# Patient Record
Sex: FEMALE | Race: WHITE | NOT HISPANIC OR LATINO | URBAN - METROPOLITAN AREA
[De-identification: names, ages, dates, MRNs, and addresses within clinical notes are randomized per-mention and may not be internally consistent; named-entity substitution may affect disease eponyms.]

---

## 2020-07-10 PROBLEM — Z00.00 ENCOUNTER FOR PREVENTIVE HEALTH EXAMINATION: Status: ACTIVE | Noted: 2020-07-10

## 2020-09-13 ENCOUNTER — EMERGENCY (EMERGENCY)
Facility: HOSPITAL | Age: 45
LOS: 1 days | Discharge: HOME | End: 2020-09-13
Attending: STUDENT IN AN ORGANIZED HEALTH CARE EDUCATION/TRAINING PROGRAM | Admitting: STUDENT IN AN ORGANIZED HEALTH CARE EDUCATION/TRAINING PROGRAM
Payer: OTHER MISCELLANEOUS

## 2020-09-13 VITALS
SYSTOLIC BLOOD PRESSURE: 163 MMHG | OXYGEN SATURATION: 98 % | DIASTOLIC BLOOD PRESSURE: 100 MMHG | RESPIRATION RATE: 18 BRPM | HEART RATE: 80 BPM | TEMPERATURE: 98 F

## 2020-09-13 DIAGNOSIS — Z90.49 ACQUIRED ABSENCE OF OTHER SPECIFIED PARTS OF DIGESTIVE TRACT: Chronic | ICD-10-CM

## 2020-09-13 LAB
HIV COMBO RESULT: SIGNIFICANT CHANGE UP
HIV1+2 AB SPEC QL: SIGNIFICANT CHANGE UP

## 2020-09-13 PROCEDURE — 99283 EMERGENCY DEPT VISIT LOW MDM: CPT

## 2020-09-13 NOTE — ED PROVIDER NOTE - OBJECTIVE STATEMENT
45 y/o F hx htn s/p injury on thursday where a 5-10lb mario hit her on the right side of the head. There was no LOC or fall afterwards. Felt a bit dizzy and "out of it" for a bit, which has since resolved. She went to urgent care who told her she likely has concussion and to rest. Pain persisted, called PMD last night, told her to come to ED for eval. Has also noticed bump on outer left upper eyelid since yesterday, mild tender to palpation, no effect on vision. Denies current n/v, cp, sob, f/c, dizziness, weakness.

## 2020-09-13 NOTE — ED ADULT NURSE NOTE - OBJECTIVE STATEMENT
Received pt to intake bed 7, A+Ox3, ambulatory. C/O head trauma few days ago, when a window fell down on her head. NO LOC. pt originally had concussion like symptoms that subsided.  Respirations even and unlabored, normal work of breathing, no accessory muscle use, speaking in full clear uninterrupted sentences. ABD is soft, non tender, non distended with normal active bowel sounds. Patient with cranial nerves intact, equal strength bilaterally, sensation equal bilaterally, no facial droop, no clear and coherent speech, intact finger to nose, negative pronator drift. Pt denies any chest pain, SOB, headache, dizziness, N/V/D, fever, chills. Labs sent,  will continue to monitor.

## 2020-09-13 NOTE — ED PROVIDER NOTE - PHYSICAL EXAMINATION
Vitals: I have reviewed the patients vital signs. HTN  General: well appearing, well nourished, no acute distress  HEENT: mild right temporal pain without obvious ecchymosis or hematoma, normocephalic, airway patent, EOMI and appropriate tracking  Neck: no JVD, no tracheal deviation, no goiter  Chest/Lungs: no trauma, symmetric chest rise, lung sounds clear bilaterally, speaking in complete sentences  Heart: Regular rate, regular rhythm, skin well perfused  Neuro: A+Ox3, CN II-XII intact, speech coherent and non dysarthric, non ataxic gait, finger-nose and heel-shin testing normal. Muscle strength at baseline in all extremities 5/5, sensation intact.   Eyes: PERRL, EOMI, no conjunctival injection, mild ttp without significant swelling to upper outer lid, no erythema or fluctuance, no pain with EOM   MSK: all limbs at baseline strength, no wasting or atrophy, moving all 4 extremities  Skin: no bleeding, no cyanosis, no jaundice, no new emergent lesions

## 2020-09-13 NOTE — ED PROVIDER NOTE - ATTENDING CONTRIBUTION TO CARE
43 yo F past medical history HTN (currently diet/ exercise, pending pcp reassessment on 9/27) had metal bar used to open/close window fall and hit the right side of her head, had some nausea and dizziness which has since resolved. yesterday went for eval but pcp not there, was told by covering staff she needed to come to ED so came today for eval. patient denies symptoms at this time. exam as above. minor head trauma with poss concussive symptoms now improved. Patient denies taking anti-platelet or anti-coagulation agent. stable for dc. elevated bp noted. per patient baseline. is asymptomatic and has out-patient follow up with pcp next week. Return precautions were discussed with patient at bedside and patient expressed understanding. hiv test per screening.

## 2020-09-13 NOTE — ED PROVIDER NOTE - NSFOLLOWUPINSTRUCTIONS_ED_ALL_ED_FT
You came to the ER with head pain after an injury. Your neurologic exam was normal and not concerning for an emergent process. Your eye exam was also normal, you may have a very small cyst or stye on the eyelid which can be treated with warm compresses.     Please return to the ED if the pain suddenly worsens, you become nauseous or vomit, you feel unsteady on your feet, pass out, or are otherwise concerned and would like re evaluation. Follow up with your PCP as planned.

## 2020-09-13 NOTE — ED PROVIDER NOTE - PATIENT PORTAL LINK FT
You can access the FollowMyHealth Patient Portal offered by [facility] by registering at the following website: [email or web address]. By joining [facility]’s FollowMyHealth portal, you will also be able to view your health information using other applications (apps) compatible with our system.

## 2020-09-13 NOTE — ED PROVIDER NOTE - CLINICAL SUMMARY MEDICAL DECISION MAKING FREE TEXT BOX
45 y/o F hx mild htn post injury on thursday where she was struck in the head by a light falling object. Has had persistent improving pain since then. Was initially weak, dizzy, but has since resolved. Neuro exam normal, eye exam normal, no focal deficit. Low concern for ICH or other intracerebral process at this time, likely residual from mild concussive syndrome. Will DC with return precautions, already has PCP appointment

## 2020-09-13 NOTE — ED PROVIDER NOTE - NS ED ROS FT
Constitutional: (-) fever (-) vomiting  Eyes/ENT: (-) vision changes, (-) hearing changes  Cardiovascular: (-) chest pain, (-) wheezing  Respiratory: (-) cough, (-) shortness of breath  Gastrointestinal: (-) vomiting, (-) diarrhea, (-) abdominal pain  : (-) dysuria   Musculoskeletal: (-) back pain  Integumentary: (-) rash, (-) edema  Neurological: (-)loc  Allergic/Immunologic: (-) pruritus  Endocrine: No history of thyroid disease

## 2020-09-13 NOTE — ED ADULT TRIAGE NOTE - CHIEF COMPLAINT QUOTE
mario hit head on thursday. denies loc.  denies n,v. c/o pain above l eye. denies visual problems. denies dizziness at present. reports continued pain to forehead.  followed by pmd for elevated bp on no meds

## 2025-02-10 NOTE — ED ADULT NURSE NOTE - NSFALLRSKHARMRISK_ED_ALL_ED
Last Appointment:  12/12/2024  Future Appointments   Date Time Provider Department Center   3/11/2025  1:40 PM Damaris Junior MD [facility]   Patient establishing with new provider 3/11/2025. Pended 90 days with no refills   
no